# Patient Record
Sex: FEMALE | Race: WHITE
[De-identification: names, ages, dates, MRNs, and addresses within clinical notes are randomized per-mention and may not be internally consistent; named-entity substitution may affect disease eponyms.]

---

## 2024-09-16 PROBLEM — Z00.00 ENCOUNTER FOR PREVENTIVE HEALTH EXAMINATION: Status: ACTIVE | Noted: 2024-09-16

## 2024-09-30 ENCOUNTER — APPOINTMENT (OUTPATIENT)
Dept: HEPATOLOGY | Facility: CLINIC | Age: 24
End: 2024-09-30
Payer: COMMERCIAL

## 2024-09-30 VITALS
DIASTOLIC BLOOD PRESSURE: 85 MMHG | SYSTOLIC BLOOD PRESSURE: 139 MMHG | BODY MASS INDEX: 27.6 KG/M2 | RESPIRATION RATE: 16 BRPM | HEIGHT: 62 IN | HEART RATE: 75 BPM | WEIGHT: 150 LBS | OXYGEN SATURATION: 97 %

## 2024-09-30 DIAGNOSIS — R74.8 ABNORMAL LEVELS OF OTHER SERUM ENZYMES: ICD-10-CM

## 2024-09-30 PROCEDURE — 99204 OFFICE O/P NEW MOD 45 MIN: CPT

## 2024-09-30 NOTE — REVIEW OF SYSTEMS
[Fever] : no fever [Chills] : no chills [Nosebleeds] : no nosebleeds [Shortness Of Breath] : no shortness of breath [Wheezing] : no wheezing [Cough] : no cough [Abdominal Pain] : no abdominal pain [Vomiting] : no vomiting [Constipation] : no constipation [Diarrhea] : no diarrhea [Melena] : no melena [Limb Swelling] : no limb swelling [Confused] : no confusion [Anxiety] : no anxiety [Easy Bleeding] : no tendency for easy bleeding [Easy Bruising] : no tendency for easy bruising [de-identified] : stress rash in neck

## 2024-09-30 NOTE — HISTORY OF PRESENT ILLNESS
[FreeTextEntry1] : Referring from Dr. Tapia RFR: WILFREDO Devlin is a 24y female with frequent UTI since 2021 (last UTI 6/2024) who is referred by rheumatology/PCP for LFT elevation 6/2024 and WILFREDO 1:320 to  WILFREDO 1: 640.   [Timeline]  PT took Macrobid 6/2024 for UTI.  PCP labs for right abd pain in 6/2024.  RUQ pain started late 7/2024 until 8/2024 Pt took Augmentin in late 9/2024 for sinus infection.  Today pt complains of itchiness whole body. dry eye, no diarrhea constipation. RUQ pain is resolved. OCP since 2000  [Medical Hx] as above [Surgical Hx] wisdom teeth remove [Social Hx] Alcohol: Social                Tobacco: Never     illicit drug: Denies           Herb and dietary Supplement: protein shake : fairlife milk [FMH of liver disease]  Father: OLT 2013 2/2 Hep C cirrhosis maternal grandmother- AIH    [Labs]  9/11/24 Neg: ACE AMA SMA LKM IGM IGA IGG, RA Lupus  7/15/24 AST/ALT 20/17    [Imaging]  CT abd 6/20/24: Mild hepatomegaly  [Procedures] EGD: No prior Colonoscopy: No prior

## 2024-09-30 NOTE — PHYSICAL EXAM
[Non-Tender] : non-tender [General Appearance - Alert] : alert [Sclera] : the sclera and conjunctiva were normal [Outer Ear] : the ears and nose were normal in appearance [] : no respiratory distress [Heart Sounds] : normal S1 and S2 [Murmurs] : no murmurs [Abnormal Walk] : normal gait [Skin Color & Pigmentation] : normal skin color and pigmentation [Oriented To Time, Place, And Person] : oriented to person, place, and time [Impaired Insight] : insight and judgment were intact [Affect] : the affect was normal [Scleral Icterus] : No Scleral Icterus [Spider Angioma] : No spider angioma(s) were observed [Abdominal  Ascites] : no ascites [Splenomegaly] : no splenomegaly [Asterixis] : no asterixis observed [Jaundice] : No jaundice

## 2024-09-30 NOTE — ASSESSMENT
[FreeTextEntry1] :  [Assessment]  24y female with frequent UTI since 2021 (last UTI 6/2024) who is referred by rheumatology/PCP for LFT elevation 6/2024 and WILFREDO 1:320 to WILFREDO 1: 640.  PT took Macrobid 6/2024 for UTI.  PCP labs for right abd pain in 6/2024.   [Plan]   # LFT elevation in 6/2024 # WILFREDO 1:640 likely due to DILI since LFT wnl in 7/2024 Pt does not have AIH Livertox: Nitrofurantoin is currently one of the most common causes of drug induced liver injury. Liver injury from nitrofurantoin can cause either an acute or chronic hepatitis-like syndrome.  - Stop take Macrobid  - Education and counseling were provided to the patient. - Discussed at length with the patient that next course of action would depend on results   Labs toady Fibroscan in a month RTO in a month   # Health Maintenance - Screen Hep A, B, C

## 2024-09-30 NOTE — REVIEW OF SYSTEMS
[Fever] : no fever [Chills] : no chills [Nosebleeds] : no nosebleeds [Shortness Of Breath] : no shortness of breath [Wheezing] : no wheezing [Cough] : no cough [Abdominal Pain] : no abdominal pain [Vomiting] : no vomiting [Constipation] : no constipation [Diarrhea] : no diarrhea [Melena] : no melena [Limb Swelling] : no limb swelling [Confused] : no confusion [Anxiety] : no anxiety [Easy Bleeding] : no tendency for easy bleeding [Easy Bruising] : no tendency for easy bruising [de-identified] : stress rash in neck

## 2024-11-04 ENCOUNTER — APPOINTMENT (OUTPATIENT)
Dept: HEPATOLOGY | Facility: CLINIC | Age: 24
End: 2024-11-04

## 2024-11-04 DIAGNOSIS — R74.8 ABNORMAL LEVELS OF OTHER SERUM ENZYMES: ICD-10-CM

## 2024-11-04 PROCEDURE — 99212 OFFICE O/P EST SF 10 MIN: CPT

## 2024-11-04 PROCEDURE — 76981 USE PARENCHYMA: CPT

## 2024-11-11 LAB
ESTIMATED AVERAGE GLUCOSE: 97 MG/DL
HBA1C MFR BLD HPLC: 5 %

## 2024-12-02 ENCOUNTER — APPOINTMENT (OUTPATIENT)
Dept: RHEUMATOLOGY | Facility: CLINIC | Age: 24
End: 2024-12-02
Payer: COMMERCIAL

## 2024-12-02 ENCOUNTER — NON-APPOINTMENT (OUTPATIENT)
Age: 24
End: 2024-12-02

## 2024-12-02 VITALS
HEIGHT: 62 IN | WEIGHT: 154 LBS | OXYGEN SATURATION: 98 % | BODY MASS INDEX: 28.34 KG/M2 | DIASTOLIC BLOOD PRESSURE: 68 MMHG | SYSTOLIC BLOOD PRESSURE: 118 MMHG | HEART RATE: 88 BPM | TEMPERATURE: 97.6 F

## 2024-12-02 DIAGNOSIS — R76.8 OTHER SPECIFIED ABNORMAL IMMUNOLOGICAL FINDINGS IN SERUM: ICD-10-CM

## 2024-12-02 PROCEDURE — 99205 OFFICE O/P NEW HI 60 MIN: CPT

## 2024-12-02 PROCEDURE — G2211 COMPLEX E/M VISIT ADD ON: CPT | Mod: NC

## 2024-12-03 PROBLEM — R76.8 ANA POSITIVE: Status: ACTIVE | Noted: 2024-12-03

## 2025-01-09 ENCOUNTER — NON-APPOINTMENT (OUTPATIENT)
Age: 25
End: 2025-01-09

## 2025-01-12 ENCOUNTER — INPATIENT (INPATIENT)
Facility: HOSPITAL | Age: 25
LOS: 0 days | Discharge: HOME | DRG: 872 | End: 2025-01-13
Attending: STUDENT IN AN ORGANIZED HEALTH CARE EDUCATION/TRAINING PROGRAM | Admitting: STUDENT IN AN ORGANIZED HEALTH CARE EDUCATION/TRAINING PROGRAM
Payer: COMMERCIAL

## 2025-01-12 VITALS
TEMPERATURE: 99 F | RESPIRATION RATE: 19 BRPM | HEART RATE: 142 BPM | DIASTOLIC BLOOD PRESSURE: 90 MMHG | WEIGHT: 153 LBS | OXYGEN SATURATION: 96 % | SYSTOLIC BLOOD PRESSURE: 135 MMHG | HEIGHT: 62 IN

## 2025-01-12 DIAGNOSIS — Z29.9 ENCOUNTER FOR PROPHYLACTIC MEASURES, UNSPECIFIED: ICD-10-CM

## 2025-01-12 DIAGNOSIS — K52.9 NONINFECTIVE GASTROENTERITIS AND COLITIS, UNSPECIFIED: ICD-10-CM

## 2025-01-12 DIAGNOSIS — R11.2 NAUSEA WITH VOMITING, UNSPECIFIED: ICD-10-CM

## 2025-01-12 DIAGNOSIS — D72.825 BANDEMIA: ICD-10-CM

## 2025-01-12 LAB
ALBUMIN SERPL ELPH-MCNC: 3.1 G/DL — LOW (ref 3.3–5)
ALBUMIN SERPL ELPH-MCNC: 3.7 G/DL — SIGNIFICANT CHANGE UP (ref 3.3–5)
ALP SERPL-CCNC: 102 U/L — SIGNIFICANT CHANGE UP (ref 40–120)
ALP SERPL-CCNC: 85 U/L — SIGNIFICANT CHANGE UP (ref 40–120)
ALT FLD-CCNC: 19 U/L — SIGNIFICANT CHANGE UP (ref 12–78)
ALT FLD-CCNC: 21 U/L — SIGNIFICANT CHANGE UP (ref 12–78)
ANION GAP SERPL CALC-SCNC: 7 MMOL/L — SIGNIFICANT CHANGE UP (ref 5–17)
ANION GAP SERPL CALC-SCNC: 8 MMOL/L — SIGNIFICANT CHANGE UP (ref 5–17)
AST SERPL-CCNC: 18 U/L — SIGNIFICANT CHANGE UP (ref 15–37)
AST SERPL-CCNC: 18 U/L — SIGNIFICANT CHANGE UP (ref 15–37)
BASOPHILS # BLD AUTO: 0 K/UL — SIGNIFICANT CHANGE UP (ref 0–0.2)
BASOPHILS # BLD AUTO: 0.03 K/UL — SIGNIFICANT CHANGE UP (ref 0–0.2)
BASOPHILS NFR BLD AUTO: 0 % — SIGNIFICANT CHANGE UP (ref 0–2)
BASOPHILS NFR BLD AUTO: 0.3 % — SIGNIFICANT CHANGE UP (ref 0–2)
BILIRUB SERPL-MCNC: 0.6 MG/DL — SIGNIFICANT CHANGE UP (ref 0.2–1.2)
BILIRUB SERPL-MCNC: 0.7 MG/DL — SIGNIFICANT CHANGE UP (ref 0.2–1.2)
BUN SERPL-MCNC: 12 MG/DL — SIGNIFICANT CHANGE UP (ref 7–23)
BUN SERPL-MCNC: 9 MG/DL — SIGNIFICANT CHANGE UP (ref 7–23)
CALCIUM SERPL-MCNC: 7.7 MG/DL — LOW (ref 8.5–10.1)
CALCIUM SERPL-MCNC: 9.1 MG/DL — SIGNIFICANT CHANGE UP (ref 8.5–10.1)
CHLORIDE SERPL-SCNC: 108 MMOL/L — SIGNIFICANT CHANGE UP (ref 96–108)
CHLORIDE SERPL-SCNC: 109 MMOL/L — HIGH (ref 96–108)
CO2 SERPL-SCNC: 22 MMOL/L — SIGNIFICANT CHANGE UP (ref 22–31)
CO2 SERPL-SCNC: 23 MMOL/L — SIGNIFICANT CHANGE UP (ref 22–31)
CREAT SERPL-MCNC: 0.87 MG/DL — SIGNIFICANT CHANGE UP (ref 0.5–1.3)
CREAT SERPL-MCNC: 0.94 MG/DL — SIGNIFICANT CHANGE UP (ref 0.5–1.3)
EGFR: 88 ML/MIN/1.73M2 — SIGNIFICANT CHANGE UP
EGFR: 97 ML/MIN/1.73M2 — SIGNIFICANT CHANGE UP
EOSINOPHIL # BLD AUTO: 0 K/UL — SIGNIFICANT CHANGE UP (ref 0–0.5)
EOSINOPHIL # BLD AUTO: 0 K/UL — SIGNIFICANT CHANGE UP (ref 0–0.5)
EOSINOPHIL NFR BLD AUTO: 0 % — SIGNIFICANT CHANGE UP (ref 0–6)
EOSINOPHIL NFR BLD AUTO: 0 % — SIGNIFICANT CHANGE UP (ref 0–6)
GI PCR PANEL: DETECTED
GLUCOSE SERPL-MCNC: 119 MG/DL — HIGH (ref 70–99)
GLUCOSE SERPL-MCNC: 156 MG/DL — HIGH (ref 70–99)
HCG SERPL-ACNC: <1 MIU/ML — SIGNIFICANT CHANGE UP
HCT VFR BLD CALC: 38.9 % — SIGNIFICANT CHANGE UP (ref 34.5–45)
HCT VFR BLD CALC: 40.6 % — SIGNIFICANT CHANGE UP (ref 34.5–45)
HCT VFR BLD CALC: 41.9 % — SIGNIFICANT CHANGE UP (ref 34.5–45)
HCT VFR BLD CALC: 45 % — SIGNIFICANT CHANGE UP (ref 34.5–45)
HGB BLD-MCNC: 13.7 G/DL — SIGNIFICANT CHANGE UP (ref 11.5–15.5)
HGB BLD-MCNC: 14 G/DL — SIGNIFICANT CHANGE UP (ref 11.5–15.5)
HGB BLD-MCNC: 14.5 G/DL — SIGNIFICANT CHANGE UP (ref 11.5–15.5)
HGB BLD-MCNC: 16 G/DL — HIGH (ref 11.5–15.5)
IMM GRANULOCYTES NFR BLD AUTO: 0.5 % — SIGNIFICANT CHANGE UP (ref 0–0.9)
LIDOCAIN IGE QN: 25 U/L — SIGNIFICANT CHANGE UP (ref 13–75)
LYMPHOCYTES # BLD AUTO: 0.23 K/UL — LOW (ref 1–3.3)
LYMPHOCYTES # BLD AUTO: 0.37 K/UL — LOW (ref 1–3.3)
LYMPHOCYTES # BLD AUTO: 2 % — LOW (ref 13–44)
LYMPHOCYTES # BLD AUTO: 3.9 % — LOW (ref 13–44)
MAGNESIUM SERPL-MCNC: 1.6 MG/DL — SIGNIFICANT CHANGE UP (ref 1.6–2.6)
MCHC RBC-ENTMCNC: 31.3 PG — SIGNIFICANT CHANGE UP (ref 27–34)
MCHC RBC-ENTMCNC: 31.4 PG — SIGNIFICANT CHANGE UP (ref 27–34)
MCHC RBC-ENTMCNC: 34.6 G/DL — SIGNIFICANT CHANGE UP (ref 32–36)
MCHC RBC-ENTMCNC: 35.6 G/DL — SIGNIFICANT CHANGE UP (ref 32–36)
MCV RBC AUTO: 88.4 FL — SIGNIFICANT CHANGE UP (ref 80–100)
MCV RBC AUTO: 90.5 FL — SIGNIFICANT CHANGE UP (ref 80–100)
MONOCYTES # BLD AUTO: 0.23 K/UL — SIGNIFICANT CHANGE UP (ref 0–0.9)
MONOCYTES # BLD AUTO: 0.37 K/UL — SIGNIFICANT CHANGE UP (ref 0–0.9)
MONOCYTES NFR BLD AUTO: 2 % — SIGNIFICANT CHANGE UP (ref 2–14)
MONOCYTES NFR BLD AUTO: 3.9 % — SIGNIFICANT CHANGE UP (ref 2–14)
NEUTROPHILS # BLD AUTO: 10.88 K/UL — HIGH (ref 1.8–7.4)
NEUTROPHILS # BLD AUTO: 8.63 K/UL — HIGH (ref 1.8–7.4)
NEUTROPHILS NFR BLD AUTO: 82 % — HIGH (ref 43–77)
NEUTROPHILS NFR BLD AUTO: 91.4 % — HIGH (ref 43–77)
NOROVIRUS GI+II RNA STL QL NAA+NON-PROBE: DETECTED
NRBC # BLD: 0 /100 WBCS — SIGNIFICANT CHANGE UP (ref 0–0)
NRBC # BLD: SIGNIFICANT CHANGE UP /100 WBCS (ref 0–0)
PHOSPHATE SERPL-MCNC: 2.7 MG/DL — SIGNIFICANT CHANGE UP (ref 2.5–4.5)
PLATELET # BLD AUTO: 269 K/UL — SIGNIFICANT CHANGE UP (ref 150–400)
PLATELET # BLD AUTO: 299 K/UL — SIGNIFICANT CHANGE UP (ref 150–400)
POTASSIUM SERPL-MCNC: 3.8 MMOL/L — SIGNIFICANT CHANGE UP (ref 3.5–5.3)
POTASSIUM SERPL-MCNC: 3.8 MMOL/L — SIGNIFICANT CHANGE UP (ref 3.5–5.3)
POTASSIUM SERPL-SCNC: 3.8 MMOL/L — SIGNIFICANT CHANGE UP (ref 3.5–5.3)
POTASSIUM SERPL-SCNC: 3.8 MMOL/L — SIGNIFICANT CHANGE UP (ref 3.5–5.3)
PROT SERPL-MCNC: 6.1 G/DL — SIGNIFICANT CHANGE UP (ref 6–8.3)
PROT SERPL-MCNC: 7.4 G/DL — SIGNIFICANT CHANGE UP (ref 6–8.3)
RBC # BLD: 4.63 M/UL — SIGNIFICANT CHANGE UP (ref 3.8–5.2)
RBC # BLD: 5.09 M/UL — SIGNIFICANT CHANGE UP (ref 3.8–5.2)
RBC # FLD: 12 % — SIGNIFICANT CHANGE UP (ref 10.3–14.5)
RBC # FLD: 12.4 % — SIGNIFICANT CHANGE UP (ref 10.3–14.5)
SODIUM SERPL-SCNC: 138 MMOL/L — SIGNIFICANT CHANGE UP (ref 135–145)
SODIUM SERPL-SCNC: 139 MMOL/L — SIGNIFICANT CHANGE UP (ref 135–145)
WBC # BLD: 11.33 K/UL — HIGH (ref 3.8–10.5)
WBC # BLD: 9.45 K/UL — SIGNIFICANT CHANGE UP (ref 3.8–10.5)
WBC # FLD AUTO: 11.33 K/UL — HIGH (ref 3.8–10.5)
WBC # FLD AUTO: 9.45 K/UL — SIGNIFICANT CHANGE UP (ref 3.8–10.5)

## 2025-01-12 PROCEDURE — 99223 1ST HOSP IP/OBS HIGH 75: CPT | Mod: GC

## 2025-01-12 PROCEDURE — 93010 ELECTROCARDIOGRAM REPORT: CPT

## 2025-01-12 PROCEDURE — 99285 EMERGENCY DEPT VISIT HI MDM: CPT

## 2025-01-12 PROCEDURE — 74177 CT ABD & PELVIS W/CONTRAST: CPT | Mod: 26

## 2025-01-12 RX ORDER — GINKGO BILOBA 40 MG
3 CAPSULE ORAL AT BEDTIME
Refills: 0 | Status: DISCONTINUED | OUTPATIENT
Start: 2025-01-12 | End: 2025-01-13

## 2025-01-12 RX ORDER — ONDANSETRON 4 MG/1
4 TABLET ORAL ONCE
Refills: 0 | Status: COMPLETED | OUTPATIENT
Start: 2025-01-12 | End: 2025-01-12

## 2025-01-12 RX ORDER — CEFTRIAXONE SODIUM 1 G/1
1000 INJECTION, POWDER, FOR SOLUTION INTRAMUSCULAR; INTRAVENOUS EVERY 24 HOURS
Refills: 0 | Status: DISCONTINUED | OUTPATIENT
Start: 2025-01-13 | End: 2025-01-13

## 2025-01-12 RX ORDER — SODIUM CHLORIDE 9 MG/ML
1000 INJECTION, SOLUTION INTRAMUSCULAR; INTRAVENOUS; SUBCUTANEOUS
Refills: 0 | Status: DISCONTINUED | OUTPATIENT
Start: 2025-01-12 | End: 2025-01-13

## 2025-01-12 RX ORDER — PIPERACILLIN AND TAZOBACTAM 3; .375 G/15ML; G/15ML
3.38 INJECTION, POWDER, LYOPHILIZED, FOR SOLUTION INTRAVENOUS EVERY 8 HOURS
Refills: 0 | Status: DISCONTINUED | OUTPATIENT
Start: 2025-01-12 | End: 2025-01-12

## 2025-01-12 RX ORDER — ACETAMINOPHEN 80 MG/.8ML
650 SOLUTION/ DROPS ORAL EVERY 6 HOURS
Refills: 0 | Status: DISCONTINUED | OUTPATIENT
Start: 2025-01-12 | End: 2025-01-13

## 2025-01-12 RX ORDER — SODIUM CHLORIDE 9 MG/ML
1000 INJECTION, SOLUTION INTRAMUSCULAR; INTRAVENOUS; SUBCUTANEOUS ONCE
Refills: 0 | Status: COMPLETED | OUTPATIENT
Start: 2025-01-12 | End: 2025-01-12

## 2025-01-12 RX ORDER — PIPERACILLIN AND TAZOBACTAM 3; .375 G/15ML; G/15ML
3.38 INJECTION, POWDER, LYOPHILIZED, FOR SOLUTION INTRAVENOUS ONCE
Refills: 0 | Status: COMPLETED | OUTPATIENT
Start: 2025-01-12 | End: 2025-01-12

## 2025-01-12 RX ORDER — MAG HYDROX/ALUMINUM HYD/SIMETH 200-200-20
30 SUSPENSION, ORAL (FINAL DOSE FORM) ORAL EVERY 4 HOURS
Refills: 0 | Status: DISCONTINUED | OUTPATIENT
Start: 2025-01-12 | End: 2025-01-13

## 2025-01-12 RX ORDER — ONDANSETRON 4 MG/1
4 TABLET ORAL EVERY 8 HOURS
Refills: 0 | Status: DISCONTINUED | OUTPATIENT
Start: 2025-01-12 | End: 2025-01-13

## 2025-01-12 RX ADMIN — SODIUM CHLORIDE 100 MILLILITER(S): 9 INJECTION, SOLUTION INTRAMUSCULAR; INTRAVENOUS; SUBCUTANEOUS at 15:27

## 2025-01-12 RX ADMIN — ONDANSETRON 4 MILLIGRAM(S): 4 TABLET ORAL at 07:49

## 2025-01-12 RX ADMIN — ONDANSETRON 4 MILLIGRAM(S): 4 TABLET ORAL at 03:57

## 2025-01-12 RX ADMIN — SODIUM CHLORIDE 1000 MILLILITER(S): 9 INJECTION, SOLUTION INTRAMUSCULAR; INTRAVENOUS; SUBCUTANEOUS at 03:53

## 2025-01-12 RX ADMIN — PIPERACILLIN AND TAZOBACTAM 200 GRAM(S): 3; .375 INJECTION, POWDER, LYOPHILIZED, FOR SOLUTION INTRAVENOUS at 05:30

## 2025-01-12 RX ADMIN — Medication 30 MILLILITER(S): at 22:12

## 2025-01-12 RX ADMIN — SODIUM CHLORIDE 1000 MILLILITER(S): 9 INJECTION, SOLUTION INTRAMUSCULAR; INTRAVENOUS; SUBCUTANEOUS at 03:57

## 2025-01-12 RX ADMIN — Medication 20 MILLIGRAM(S): at 12:24

## 2025-01-12 RX ADMIN — PIPERACILLIN AND TAZOBACTAM 25 GRAM(S): 3; .375 INJECTION, POWDER, LYOPHILIZED, FOR SOLUTION INTRAVENOUS at 09:53

## 2025-01-12 RX ADMIN — Medication 30 MILLILITER(S): at 13:44

## 2025-01-12 RX ADMIN — PIPERACILLIN AND TAZOBACTAM 25 GRAM(S): 3; .375 INJECTION, POWDER, LYOPHILIZED, FOR SOLUTION INTRAVENOUS at 18:25

## 2025-01-12 RX ADMIN — Medication 25 MILLIGRAM(S): at 09:53

## 2025-01-12 NOTE — H&P ADULT - NSHPREVIEWOFSYSTEMS_GEN_ALL_CORE
CONSTITUTIONAL: denies fever, chills, diaphoresis, fatigue, weakness, dizziness, lightheadedness  HEENT: denies blurred vision, sore throat, cough  SKIN: denies new lesions, rash, hives, itching  CARDIOVASCULAR: denies chest pain, chest pressure, palpitations  RESPIRATORY: denies shortness of breath, MCKEON, wheezing, sputum production  GASTROINTESTINAL: +abdominal pain, +vomiting, +diarrhea, +melena  GENITOURINARY: denies dysuria, polyuria, hematuria, discharge  NEUROLOGICAL: denies syncope, LOC, numbness, headache, focal weakness  MUSCULOSKELETAL: denies new joint pain, joint swelling, muscle aches  HEMATOLOGIC: denies gross bleeding, bruising  LYMPHATICS: denies enlarged lymph nodes, extremity swelling  PSYCHIATRIC: denies recent changes in anxiety, depression  ENDOCRINOLOGIC: denies sweating, cold or heat intolerance

## 2025-01-12 NOTE — PATIENT PROFILE ADULT - FUNCTIONAL ASSESSMENT - DAILY ACTIVITY 5.
Plan: Pt will follow up with derm after initial tx on arms; will treat face/scalp in future Initiate Treatment: 5FU BID x 2 weeks, then d/c. Detail Level: Simple 4 = No assist / stand by assistance

## 2025-01-12 NOTE — PROVIDER CONTACT NOTE (CRITICAL VALUE NOTIFICATION) - ASSESSMENT
Spoke with Luz @ [facility]. Prescription for compounded testosterone is 1mg/gram daily 30 gram tube 1 refill.     Also please advise on pt concern on the recent estrogen dosage received.    Patient currently in ct scan.

## 2025-01-12 NOTE — H&P ADULT - NSHPPHYSICALEXAM_GEN_ALL_CORE
T(C): 37.4 (01-12-25 @ 05:33), Max: 37.4 (01-12-25 @ 05:33)  HR: 118 (01-12-25 @ 05:33) (118 - 142)  BP: 128/91 (01-12-25 @ 05:33) (128/91 - 135/90)  RR: 18 (01-12-25 @ 05:33) (18 - 19)  SpO2: 98% (01-12-25 @ 05:33) (96% - 98%)    GENERAL: patient appears well, no acute distress, appropriate, pleasant  EYES: sclera clear, no exudates  ENMT: oropharynx clear without erythema, no exudates, moist mucous membranes  NECK: supple, soft, no thyromegaly noted  LUNGS: good air entry bilaterally, clear to auscultation, symmetric breath sounds, no wheezing or rhonchi appreciated  HEART: soft S1/S2, regular rate and rhythm, no murmurs noted, no lower extremity edema  GASTROINTESTINAL: abdomen is soft, nontender, nondistended, normoactive bowel sounds, no palpable masses  INTEGUMENT: good skin turgor, no lesions noted  MUSCULOSKELETAL: no clubbing or cyanosis  NEURO: alert and oriented x3   PSYCHIATRIC: mood is good, affect is congruent, linear and logical thought process

## 2025-01-12 NOTE — H&P ADULT - NSHPSOCIALHISTORY_GEN_ALL_CORE
Tobacco: denies  EtOH: socially  Recreational drug use: denies  Lives with: patient lives with mother  Ambulates: independent  ADLs: independent

## 2025-01-12 NOTE — ED PROVIDER NOTE - OBJECTIVE STATEMENT
24-year-old female no significant past medical history complaining of nausea vomiting diarrhea since this afternoon states around 7 episodes each with the last 3 episodes of diarrhea noting some blood in the stool.  Ate Japanese food for lunch but did not eat any sushi had a chicken teriyaki.  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  Denies any fever or chills.  Tried Pepto-Bismol without much relief.

## 2025-01-12 NOTE — H&P ADULT - PROBLEM SELECTOR PLAN 1
-Pt presenting with multiple episodes of nausea and vomiting starting earlier today as well as blood tinged stools after eating chicken ramiro,   -in ED given: Zosyn 3.375g x1, NS 1L x2   -CT A/P: Fluid-filled nondilated loops of small bowel, which can be seen with nonspecific enteritis.  -continue Zosyn 3.375mg q8  -continue IV fluids   -bandemia noted on ED labs, f/u AM CBC   -order FOBT, f/u results   -order GI PCR, f/u results   -ID consulted (Dr. Holloway), f/u recs  -GI consulted (Dr. Mata), f/u recs -Pt presenting with multiple episodes of nausea and vomiting starting earlier today as well as blood tinged stools after eating chicken ramiro,   -in ED given: Zosyn 3.375g x1, NS 1L x2   -CT A/P: Fluid-filled nondilated loops of small bowel, which can be seen with nonspecific enteritis.  -continue Zosyn 3.375mg q8  -continue IV fluids   -bandemia noted on ED labs, f/u AM CBC   -order FOBT, f/u results   -order GI PCR, f/u results   -ID consulted (Dr. Hamlin) f/u recs  -GI consulted (Dr. Mata), f/u recs

## 2025-01-12 NOTE — H&P ADULT - NSICDXFAMILYHX_GEN_ALL_CORE_FT
FAMILY HISTORY:  Father  Still living? Unknown  Family history of hypertension, Age at diagnosis: Age Unknown  FH: type 2 diabetes mellitus, Age at diagnosis: Age Unknown    Mother  Still living? Unknown  Family history of hypertension, Age at diagnosis: Age Unknown    Grandparent  Still living? Unknown  Family history of breast cancer, Age at diagnosis: Age Unknown  FH: type 2 diabetes mellitus, Age at diagnosis: Age Unknown

## 2025-01-12 NOTE — CONSULT NOTE ADULT - SUBJECTIVE AND OBJECTIVE BOX
Chief Complaint:  Patient is a 24y old  Female who presents with a chief complaint of nausea and vomiting, bandemia (12 Jan 2025 05:45)    Anxiety and depression    No significant past surgical history    No significant past surgical history       HPI:  24yr old F PMH  of anxiety and depression presents to ED for vomiting and bloody diarrhea that started earlier today. Pt reports she had chicken teriyaki early afternoon at the mall. After 2 hrs she started to have stomach upset with bloating, about 7 episodes of vomiting and is unable to keep any food down. Shortly after she started having bloody tinged stools which prompted her to come to ED. Denies HA, fevers, chills, chest pain, palpitations, SOB, cough, dysuria, hematuria. Pt was recently dx with UTI 2 weeks ago and completed course of cefdinir 300mg BID x7 days.       ED course:  Vitals: BP: 135/90 , HR: 142 , Temp: 99.1, RR: 12, SpO2: 96% on RA   Labs significant for: WBC 11.33, Neutrophil: 10.88, band neutrophils 14.0%  EKG: pending   In ED given Zosyn 3.375g x1, NS 1L x2     Imaging  CT a/p: Fluid-filled nondilated loops of small bowel, which can be seen with   nonspecific enteritis.   (12 Jan 2025 05:45)      Macrobid (Other (Mild to Mod))      acetaminophen     Tablet .. 650 milliGRAM(s) Oral every 6 hours PRN  aluminum hydroxide/magnesium hydroxide/simethicone Suspension 30 milliLiter(s) Oral every 4 hours PRN  FLUoxetine 20 milliGRAM(s) Oral daily  melatonin 3 milliGRAM(s) Oral at bedtime PRN  ondansetron Injectable 4 milliGRAM(s) IV Push every 8 hours PRN  piperacillin/tazobactam IVPB.. 3.375 Gram(s) IV Intermittent every 8 hours  sodium chloride 0.9%. 1000 milliLiter(s) IV Continuous <Continuous>        FAMILY HISTORY:  Family history of breast cancer (Grandparent)    Family history of hypertension (Father, Mother)    FH: type 2 diabetes mellitus (Grandparent, Father)          Review of Systems:    General:  No wt loss, fevers, chills, night sweats,fatigue,   Eyes:  Good vision, no reported pain  ENT:  No sore throat, pain, runny nose, dysphagia  CV:  No pain, palpitatioins, hypo/hypertension  Resp:  No dyspnea, cough, tachypnea, wheezing  :  No pain, bleeding, incontinence, nocturia  Muscle:  No pain, weakness  Neuro:  No weakness, tingling, memory problems  Psych:  No fatigue, insomnia, mood problems, depression  Endocrine:  No polyuria, polydypsia, cold/heat intolerance  Heme:  No petechiae, ecchymosis, easy bruisability  Skin:  No rash, tattoos, scars, edema    Relevant Family History:       Relevant Social History:       Physical Exam:    Vital Signs:  Vital Signs Last 24 Hrs  T(C): 37.4 (12 Jan 2025 05:33), Max: 37.4 (12 Jan 2025 05:33)  T(F): 99.3 (12 Jan 2025 05:33), Max: 99.3 (12 Jan 2025 05:33)  HR: 118 (12 Jan 2025 05:33) (118 - 142)  BP: 128/91 (12 Jan 2025 05:33) (128/91 - 135/90)  BP(mean): --  RR: 18 (12 Jan 2025 05:33) (18 - 19)  SpO2: 98% (12 Jan 2025 05:33) (96% - 98%)    Parameters below as of 12 Jan 2025 05:33  Patient On (Oxygen Delivery Method): room air      Daily Height in cm: 157.48 (12 Jan 2025 03:24)    Daily     General:  Appears stated age, well-groomed, well-nourished, no distress  HEENT:  NC/AT,  conjunctivae clear and pink, no thyromegaly, nodules, adenopathy, no JVD  Chest:  Full & symmetric excursion, no increased effort, breath sounds clear  Cardiovascular:  Regular rhythm, S1, S2, no murmur/rub/S3/S4, no abdominal bruit, no edema  Abdomen:  Soft, non-tender, non-distended, normoactive bowel sounds,  no masses ,no hepatosplenomeagaly, no signs of chronic liver disease  Extremities:  no cyanosis,clubbing or edema  Skin:  No rash/erythema/ecchymoses/petechiae/wounds/abscess/warm/dry  Neuro/Psych:  Alert, oriented, no asterixis, no tremor, no encephalopathy    Laboratory:                            14.5   9.45  )-----------( 269      ( 12 Jan 2025 07:08 )             41.9     01-12    138  |  108  |  12  ----------------------------<  156[H]  3.8   |  22  |  0.94    Ca    9.1      12 Jan 2025 03:55  Mg     1.6     01-12    TPro  7.4  /  Alb  3.7  /  TBili  0.7  /  DBili  x   /  AST  18  /  ALT  21  /  AlkPhos  102  01-12    LIVER FUNCTIONS - ( 12 Jan 2025 03:55 )  Alb: 3.7 g/dL / Pro: 7.4 g/dL / ALK PHOS: 102 U/L / ALT: 21 U/L / AST: 18 U/L / GGT: x             Urinalysis Basic - ( 12 Jan 2025 03:55 )    Color: x / Appearance: x / SG: x / pH: x  Gluc: 156 mg/dL / Ketone: x  / Bili: x / Urobili: x   Blood: x / Protein: x / Nitrite: x   Leuk Esterase: x / RBC: x / WBC x   Sq Epi: x / Non Sq Epi: x / Bacteria: x      Amylase Serum--      Lipase serum25       Ammonia--    Imaging:          
  HPI:  25YO F PMH anxiety and depression presents to ED for vomiting and bloody diarrhea that started few hours after she had chicken teriyaki at the mall. After 2 hrs she started to have stomach upset with bloating, about 7 episodes of vomiting and is unable to keep any food down. Shortly after she started having bloody tinged stools which prompted her to come to ED. Denies HA, fevers, chills, chest pain, palpitations, SOB, cough, dysuria, hematuria. Pt was recently dx with UTI 2 weeks ago and completed course of cefdinir 300mg BID x7 days. In ED WBC 11.33 CT AP Fluid-filled nondilated loops of small bowel, which can be seen with nonspecific enteritis.    Infectious Disease consult was called to evaluate pt and for antibiotic management.    Past Medical & Surgical Hx:  PAST MEDICAL & SURGICAL HISTORY:  Anxiety and depression      Social History--  EtOH: denies  Tobacco: denies   Drug Use: denies      FAMILY HISTORY:  Family history of breast cancer (Grandparent)    Family history of hypertension (Father, Mother)    FH: type 2 diabetes mellitus (Grandparent, Father)        Allergies  Macrobid (Other (Mild to Mod))    Intolerances  NONE    Home Medications:  PROzac 20 mg oral capsule: 1 cap(s) orally once a day (12 Jan 2025 06:11)      Current Inpatient Medications :    ANTIBIOTICS:   piperacillin/tazobactam IVPB.. 3.375 Gram(s) IV Intermittent every 8 hours      OTHER RELEVANT MEDICATIONS :  acetaminophen     Tablet .. 650 milliGRAM(s) Oral every 6 hours PRN  aluminum hydroxide/magnesium hydroxide/simethicone Suspension 30 milliLiter(s) Oral every 4 hours PRN  FLUoxetine 20 milliGRAM(s) Oral daily  hydrOXYzine hydrochloride 25 milliGRAM(s) Oral every 6 hours PRN  melatonin 3 milliGRAM(s) Oral at bedtime PRN  ondansetron Injectable 4 milliGRAM(s) IV Push every 8 hours PRN  sodium chloride 0.9%. 1000 milliLiter(s) IV Continuous <Continuous>    ROS:  CONSTITUTIONAL:  Negative fever or chills, feels well, good appetite  EYES:  Negative  blurry vision or double vision  CARDIOVASCULAR:  Negative for chest pain or palpitations  RESPIRATORY:  Negative for cough, wheezing, or SOB   GASTROINTESTINAL:  +nausea, vomiting, bloody diarrhea abdominal pain  GENITOURINARY:  Negative frequency, urgency , dysuria or hematuria   NEUROLOGIC:  No headache, confusion, dizziness, lightheadedness  All other systems were reviewed and are negative    Physical Exam:  Vital Signs Last 24 Hrs  T(C): 37.1 (12 Jan 2025 17:42), Max: 37.4 (12 Jan 2025 05:33)  T(F): 98.7 (12 Jan 2025 17:42), Max: 99.4 (12 Jan 2025 08:25)  HR: 101 (12 Jan 2025 17:42) (101 - 142)  BP: 118/82 (12 Jan 2025 17:42) (113/80 - 135/90)  RR: 18 (12 Jan 2025 17:42) (18 - 19)  SpO2: 98% (12 Jan 2025 17:42) (96% - 98%)    Parameters below as of 12 Jan 2025 16:10  Patient On (Oxygen Delivery Method): room air      Height (cm): 157.5 (01-12 @ 03:24)  Weight (kg): 69.4 (01-12 @ 03:24)  BMI (kg/m2): 28 (01-12 @ 03:24)  BSA (m2): 1.71 (01-12 @ 03:24)    General: well developed well nourished, in no acute distress  Neck: supple, trachea midline  Lungs: clear, no wheeze/rhonchi  Cardiovascular: regular rate and rhythm, S1 S2  Abdomen: soft, mildly tender, ND, bowel sounds normal  Neurological:  alert and oriented x3  Skin: no rash  Extremities: no cyanosis/clubbing/edema    Labs:                         13.7   x     )-----------( x        ( 12 Jan 2025 16:58 )             38.9   01-12    139  |  109[H]  |  9   ----------------------------<  119[H]  3.8   |  23  |  0.87    Ca    7.7[L]      12 Jan 2025 07:08  Phos  2.7     01-12  Mg     1.6     01-12    TPro  6.1  /  Alb  3.1[L]  /  TBili  0.6  /  DBili  x   /  AST  18  /  ALT  19  /  AlkPhos  85  01-12      RECENT CULTURES:      RADIOLOGY & ADDITIONAL STUDIES:    ACC: [id number] EXAM:  CT ABDOMEN AND PELVIS IC   ORDERED BY: JEROME MANJARREZ     PROCEDURE DATE:  01/12/2025          INTERPRETATION:  CLINICAL INFORMATION: Nausea, vomiting, diarrhea, blood   in stool    COMPARISON: None.    CONTRAST/COMPLICATIONS:  IV Contrast: Omnipaque 350  90 cc administered   10 cc discarded  Oral Contrast: NONE  .    PROCEDURE:  CT of the Abdomen and Pelvis was performed.  Sagittal and coronal reformats were performed.    FINDINGS:  LOWER CHEST: Within normal limits.    LIVER: Within normal limits.  BILE DUCTS: Normal caliber.  GALLBLADDER: Within normal limits.  SPLEEN: Within normal limits.  PANCREAS: Within normal limits.  ADRENALS: Within normal limits.  KIDNEYS/URETERS: Within normal limits.    BLADDER: Within normal limits.  REPRODUCTIVE ORGANS: Uterus and adnexa within normal limits.    BOWEL: Diffuse fluid-filled nondilated small bowel. No bowel obstruction.   Appendix is normal.  PERITONEUM/RETROPERITONEUM: Within normal limits.  VESSELS: Within normal limits. Limited evaluation for active   extravasation on this single phase examination.  LYMPH NODES: No lymphadenopathy.  ABDOMINAL WALL: Within normal limits.  BONES: Within normal limits.    IMPRESSION:  Fluid-filled nondilated loops of small bowel, which can be seen with   nonspecific enteritis.    Assessment :   25YO F PMH anxiety and depression admitted with infectious enteritis  Pt with recent antibiotic use so will need to consider Cdiff if GI pcr neg though low suspicion    Plan :   Change To Rocephin  Fu GI PCR  Fu cultures  Trend temps and cbc  Serial abd exams    Advance Directives- Full code  Current Medications are documented.   Drug-drug interactions reviewed.    Continue with present regiment .  Approptiate use of antibiotics and adverse effects reviewed.      I have discussed the above plan of care with patient/family in detail. They expressed understanding of the treatment plan . Risks, benefits and alternatives discussed in detail. I have asked if they have any questions or concerns and appropriately addressed them to the best of my ability .      > 45 minutes spent in direct patient care reviewing  the notes, lab data/ imaging , discussion with multidisciplinary team. All questions were addressed and answered to the best of my capacity .    Thank you for allowing me to participate in the care of your patient .      Navdeep Ash MD  Infectious Disease  [phone]

## 2025-01-12 NOTE — H&P ADULT - ASSESSMENT
24yr old F PMH  of anxiety and depression presents to ED for vomiting and bloody diarrhea that started earlier today after eating chicken teriyaki. Pt admitted for nausea/vomiting and bandemia.  24yr old F PMH  of anxiety and depression presents to ED for vomiting and bloody diarrhea that started earlier today after eating chicken teriyaki. Pt admitted for nausea/vomiting, blood streaked stools and bandemia.

## 2025-01-12 NOTE — CHART NOTE - NSCHARTNOTEFT_GEN_A_CORE
Hospitalist Attending Chart Update / Progress Note    Please see H&P written early today by Dr. Mortensen, for more information.     Pt seen, interviewed, and examined by me. Boyfriend at bedside- allowed to speak to him per patient  Still having bloody stools- all water and blood. last one right prior to this evaluation.   denies chest pain, lightheadedness, abdominal pain   No further nausea or vomiting     Admits to anxiety from being in hospital. Takes prozac and at home occasionally atarax    T(C): 37.4 (01-12-25 @ 08:25), Max: 37.4 (01-12-25 @ 05:33)  HR: 120 (01-12-25 @ 08:25) (118 - 142)  BP: 126/87 (01-12-25 @ 08:25) (126/87 - 135/90)  RR: 18 (01-12-25 @ 08:25) (18 - 19)  SpO2: 98% (01-12-25 @ 08:25) (96% - 98%)    General: Well developed, well nourished, NAD  HEENT: NCAT, EOMI bl, moist mucous membranes   Neurology: A&Ox3, nonfocal   Respiratory: CTA B/L, No W/R/R  CV: tachycardic, +S1/S2, no murmurs, rubs or gallops  Abdominal: Soft, NT, ND +BSx4  Extremities: No C/C/E, + peripheral pulses  Skin: warm, dry, normal color    #Sepsis   #Enteritis   #GI bleed   - + Bandemia, +Tachycardia   - f/u GI PCR  - Continue zosyn   - ID/GI consulted  - continues to have blood BMs. Trend H&H  -  keep active type and screen.     #Anxiety  - continue prozac, start hydroxyzine prn anxiety    remainder of plan per H&P      Time spent: 40min

## 2025-01-12 NOTE — ED ADULT NURSE NOTE - OBJECTIVE STATEMENT
Received pt awake and alert, c/o vomiting and diarrhea started yesterday afternoon after eating japanese food.

## 2025-01-12 NOTE — ED ADULT TRIAGE NOTE - CHIEF COMPLAINT QUOTE
pt. came in from home, ambulatory, w/ c/o diarrhea and vomiting started yesterday afternoon, pt. state that she had japanese food prior she had the symptoms, known w/ anxiety, on Prozac, took Pepto bismol around 1700H yesterday, alert and oriented.

## 2025-01-12 NOTE — ED PROVIDER NOTE - CLINICAL SUMMARY MEDICAL DECISION MAKING FREE TEXT BOX
24-year-old female no significant past medical history complaining of nausea vomiting diarrhea since this afternoon states around 7 episodes each with the last 3 episodes of diarrhea noting some blood in the stool.  Ate Japanese food for lunch but did not eat any sushi had a chicken teriyaki.  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  Denies any fever or chills.  Tried Pepto-Bismol without much relief.    r/o colitis diverticulitis, labs, CT a/p, IV fluids, antiemetics, reassess

## 2025-01-12 NOTE — ED ADULT NURSE NOTE - ABDOMEN
Contact your primary care doctor for medication approval and change. Do not take your potassium on days that you had used Antivert. soft

## 2025-01-12 NOTE — ED ADULT NURSE NOTE - NSFALLUNIVINTERV_ED_ALL_ED
Bed/Stretcher in lowest position, wheels locked, appropriate side rails in place/Call bell, personal items and telephone in reach/Instruct patient to call for assistance before getting out of bed/chair/stretcher/Non-slip footwear applied when patient is off stretcher/[place] to call system/Physically safe environment - no spills, clutter or unnecessary equipment/Purposeful proactive rounding/Room/bathroom lighting operational, light cord in reach

## 2025-01-12 NOTE — H&P ADULT - HISTORY OF PRESENT ILLNESS
24yr old F PMH  of anxiety and depression presents to ED for vomiting and bloody diarrhea that started earlier today. Pt reports she had chicken teriyaki early afternoon at the mall. After 2 hrs she started to have stomach upset with bloating, about 7 episodes of vomiting and is unable to keep any food down. Shortly after she started having bloody tinged stools which prompted her to come to ED. Denies HA, fevers, chills, chest pain, palpitations, SOB, cough, dysuria, hematuria. Pt was recently dx with UTI 2 weeks ago and completed course of cefdinir 300mg BID x7 days.       ED course:  Vitals: BP: 135/90 , HR: 142 , Temp: 99.1, RR: 12, SpO2: 96% on RA   Labs significant for: WBC 11.33, Neutrophil: 10.88, band neutrophils 14.0%  EKG: pending   In ED given Zosyn 3.375g x1, NS 1L x2     Imaging  CT a/p: Fluid-filled nondilated loops of small bowel, which can be seen with   nonspecific enteritis.

## 2025-01-13 ENCOUNTER — TRANSCRIPTION ENCOUNTER (OUTPATIENT)
Age: 25
End: 2025-01-13

## 2025-01-13 VITALS
HEART RATE: 94 BPM | TEMPERATURE: 99 F | SYSTOLIC BLOOD PRESSURE: 107 MMHG | DIASTOLIC BLOOD PRESSURE: 71 MMHG | OXYGEN SATURATION: 95 % | RESPIRATION RATE: 19 BRPM

## 2025-01-13 LAB
ALBUMIN SERPL ELPH-MCNC: 2.9 G/DL — LOW (ref 3.3–5)
ALP SERPL-CCNC: 82 U/L — SIGNIFICANT CHANGE UP (ref 40–120)
ALT FLD-CCNC: 21 U/L — SIGNIFICANT CHANGE UP (ref 12–78)
ANION GAP SERPL CALC-SCNC: 4 MMOL/L — LOW (ref 5–17)
AST SERPL-CCNC: 17 U/L — SIGNIFICANT CHANGE UP (ref 15–37)
BASOPHILS # BLD AUTO: 0.03 K/UL — SIGNIFICANT CHANGE UP (ref 0–0.2)
BASOPHILS NFR BLD AUTO: 0.4 % — SIGNIFICANT CHANGE UP (ref 0–2)
BILIRUB SERPL-MCNC: 0.3 MG/DL — SIGNIFICANT CHANGE UP (ref 0.2–1.2)
BUN SERPL-MCNC: 7 MG/DL — SIGNIFICANT CHANGE UP (ref 7–23)
CALCIUM SERPL-MCNC: 8.3 MG/DL — LOW (ref 8.5–10.1)
CHLORIDE SERPL-SCNC: 110 MMOL/L — HIGH (ref 96–108)
CO2 SERPL-SCNC: 26 MMOL/L — SIGNIFICANT CHANGE UP (ref 22–31)
CREAT SERPL-MCNC: 0.7 MG/DL — SIGNIFICANT CHANGE UP (ref 0.5–1.3)
EGFR: 125 ML/MIN/1.73M2 — SIGNIFICANT CHANGE UP
EOSINOPHIL # BLD AUTO: 0.06 K/UL — SIGNIFICANT CHANGE UP (ref 0–0.5)
EOSINOPHIL NFR BLD AUTO: 0.7 % — SIGNIFICANT CHANGE UP (ref 0–6)
GLUCOSE SERPL-MCNC: 88 MG/DL — SIGNIFICANT CHANGE UP (ref 70–99)
HCT VFR BLD CALC: 41.3 % — SIGNIFICANT CHANGE UP (ref 34.5–45)
HGB BLD-MCNC: 14.1 G/DL — SIGNIFICANT CHANGE UP (ref 11.5–15.5)
IMM GRANULOCYTES NFR BLD AUTO: 0.5 % — SIGNIFICANT CHANGE UP (ref 0–0.9)
LYMPHOCYTES # BLD AUTO: 1.59 K/UL — SIGNIFICANT CHANGE UP (ref 1–3.3)
LYMPHOCYTES # BLD AUTO: 18.9 % — SIGNIFICANT CHANGE UP (ref 13–44)
MAGNESIUM SERPL-MCNC: 2.6 MG/DL — SIGNIFICANT CHANGE UP (ref 1.6–2.6)
MCHC RBC-ENTMCNC: 30.5 PG — SIGNIFICANT CHANGE UP (ref 27–34)
MCHC RBC-ENTMCNC: 34.1 G/DL — SIGNIFICANT CHANGE UP (ref 32–36)
MCV RBC AUTO: 89.4 FL — SIGNIFICANT CHANGE UP (ref 80–100)
MONOCYTES # BLD AUTO: 1.01 K/UL — HIGH (ref 0–0.9)
MONOCYTES NFR BLD AUTO: 12 % — SIGNIFICANT CHANGE UP (ref 2–14)
NEUTROPHILS # BLD AUTO: 5.68 K/UL — SIGNIFICANT CHANGE UP (ref 1.8–7.4)
NEUTROPHILS NFR BLD AUTO: 67.5 % — SIGNIFICANT CHANGE UP (ref 43–77)
NRBC # BLD: 0 /100 WBCS — SIGNIFICANT CHANGE UP (ref 0–0)
PHOSPHATE SERPL-MCNC: 1.3 MG/DL — LOW (ref 2.5–4.5)
PLATELET # BLD AUTO: 255 K/UL — SIGNIFICANT CHANGE UP (ref 150–400)
POTASSIUM SERPL-MCNC: 3.6 MMOL/L — SIGNIFICANT CHANGE UP (ref 3.5–5.3)
POTASSIUM SERPL-SCNC: 3.6 MMOL/L — SIGNIFICANT CHANGE UP (ref 3.5–5.3)
PROT SERPL-MCNC: 6.2 G/DL — SIGNIFICANT CHANGE UP (ref 6–8.3)
RBC # BLD: 4.62 M/UL — SIGNIFICANT CHANGE UP (ref 3.8–5.2)
RBC # FLD: 12.4 % — SIGNIFICANT CHANGE UP (ref 10.3–14.5)
SODIUM SERPL-SCNC: 140 MMOL/L — SIGNIFICANT CHANGE UP (ref 135–145)
WBC # BLD: 8.41 K/UL — SIGNIFICANT CHANGE UP (ref 3.8–10.5)
WBC # FLD AUTO: 8.41 K/UL — SIGNIFICANT CHANGE UP (ref 3.8–10.5)

## 2025-01-13 PROCEDURE — 84100 ASSAY OF PHOSPHORUS: CPT

## 2025-01-13 PROCEDURE — 99285 EMERGENCY DEPT VISIT HI MDM: CPT | Mod: 25

## 2025-01-13 PROCEDURE — 99239 HOSP IP/OBS DSCHRG MGMT >30: CPT | Mod: GC

## 2025-01-13 PROCEDURE — 96374 THER/PROPH/DIAG INJ IV PUSH: CPT

## 2025-01-13 PROCEDURE — 83735 ASSAY OF MAGNESIUM: CPT

## 2025-01-13 PROCEDURE — 80053 COMPREHEN METABOLIC PANEL: CPT

## 2025-01-13 PROCEDURE — 74177 CT ABD & PELVIS W/CONTRAST: CPT | Mod: MC

## 2025-01-13 PROCEDURE — 85025 COMPLETE CBC W/AUTO DIFF WBC: CPT

## 2025-01-13 PROCEDURE — 96375 TX/PRO/DX INJ NEW DRUG ADDON: CPT

## 2025-01-13 PROCEDURE — 36415 COLL VENOUS BLD VENIPUNCTURE: CPT

## 2025-01-13 PROCEDURE — 93005 ELECTROCARDIOGRAM TRACING: CPT

## 2025-01-13 RX ORDER — SODIUM PHOSPHATE, MONOBASIC, MONOHYDRATE AND SODIUM PHOSPHATE, DIBASIC ANHYDROUS 142; 276 MG/ML; MG/ML
30 INJECTION, SOLUTION INTRAVENOUS ONCE
Refills: 0 | Status: COMPLETED | OUTPATIENT
Start: 2025-01-13 | End: 2025-01-13

## 2025-01-13 RX ORDER — SOD PHOS DI, MONO/K PHOS MONO 250 MG
1 TABLET ORAL ONCE
Refills: 0 | Status: COMPLETED | OUTPATIENT
Start: 2025-01-13 | End: 2025-01-13

## 2025-01-13 RX ADMIN — Medication 1 PACKET(S): at 13:40

## 2025-01-13 RX ADMIN — Medication 20 MILLIGRAM(S): at 11:38

## 2025-01-13 RX ADMIN — SODIUM PHOSPHATE, MONOBASIC, MONOHYDRATE AND SODIUM PHOSPHATE, DIBASIC ANHYDROUS 85 MILLIMOLE(S): 142; 276 INJECTION, SOLUTION INTRAVENOUS at 11:38

## 2025-01-13 RX ADMIN — CEFTRIAXONE SODIUM 100 MILLIGRAM(S): 1 INJECTION, POWDER, FOR SOLUTION INTRAMUSCULAR; INTRAVENOUS at 05:12

## 2025-01-13 RX ADMIN — Medication 30 MILLILITER(S): at 08:47

## 2025-01-13 NOTE — PROGRESS NOTE ADULT - SUBJECTIVE AND OBJECTIVE BOX
[facility]    Alistair Mujica NP    [address] [place] [address] [phone]      Chief Complaint:  Patient is a 24y old  Female who presents with a chief complaint of nausea and vomiting, bandemia (13 Jan 2025 10:07)      HPI/ 24 hr events:   Patient seen and examined at bedside  Reports feeling well this morning   Had few soft/brown stools overnight, no further episodes of hematochezia  Tolerating diet  No fever, chills, nausea, vomiting or abdominal pain      REVIEW OF SYSTEMS:   General: Negative  HEENT: Negative  CV: Negative  Respiratory: Negative  GI: See HPI  : Negative  MSK: Negative  Hematologic: Negative  Skin: Negative    MEDICATIONS:   MEDICATIONS  (STANDING):  FLUoxetine 20 milliGRAM(s) Oral daily  sodium chloride 0.9%. 1000 milliLiter(s) (100 mL/Hr) IV Continuous <Continuous>  sodium phosphate 30 milliMole(s)/500 mL IVPB 30 milliMole(s) IV Intermittent once    MEDICATIONS  (PRN):  acetaminophen     Tablet .. 650 milliGRAM(s) Oral every 6 hours PRN Temp greater or equal to 38C (100.4F), Mild Pain (1 - 3)  aluminum hydroxide/magnesium hydroxide/simethicone Suspension 30 milliLiter(s) Oral every 4 hours PRN Dyspepsia  hydrOXYzine hydrochloride 25 milliGRAM(s) Oral every 6 hours PRN Anxiety  melatonin 3 milliGRAM(s) Oral at bedtime PRN Insomnia  ondansetron Injectable 4 milliGRAM(s) IV Push every 8 hours PRN Nausea and/or Vomiting      ALLERGIES:   Allergies    Macrobid (Other (Mild to Mod))    Intolerances        VITAL SIGNS:   Vital Signs Last 24 Hrs  T(C): 36.9 (13 Jan 2025 05:56), Max: 37.2 (12 Jan 2025 20:11)  T(F): 98.4 (13 Jan 2025 05:56), Max: 98.9 (12 Jan 2025 20:11)  HR: 89 (13 Jan 2025 05:56) (89 - 109)  BP: 107/71 (13 Jan 2025 05:56) (107/71 - 120/80)  BP(mean): --  RR: 18 (13 Jan 2025 05:56) (18 - 18)  SpO2: 97% (13 Jan 2025 05:56) (95% - 97%)    Parameters below as of 13 Jan 2025 05:56  Patient On (Oxygen Delivery Method): room air      I&O's Summary      PHYSICAL EXAM:   GENERAL:  No acute distress  HEENT:  NC/AT  CHEST:  No increased effort  HEART:  Regular rate  ABDOMEN:  Soft, non-tender, non-distended  EXTREMITIES: No cyanosis  SKIN:  Warm, dry  NEURO:  Calm, cooperative    LABS:                        14.1   8.41  )-----------( 255      ( 13 Jan 2025 08:20 )             41.3     01-13    140  |  110[H]  |  7   ----------------------------<  88  3.6   |  26  |  0.70    Ca    8.3[L]      13 Jan 2025 08:20  Phos  1.3     01-13  Mg     2.6     01-13    TPro  6.2  /  Alb  2.9[L]  /  TBili  0.3  /  DBili  x   /  AST  17  /  ALT  21  /  AlkPhos  82  01-13    LIVER FUNCTIONS - ( 13 Jan 2025 08:20 )  Alb: 2.9 g/dL / Pro: 6.2 g/dL / ALK PHOS: 82 U/L / ALT: 21 U/L / AST: 17 U/L / GGT: x                               GI PCR Panel: Detected (01-12-25 @ 08:00)                RADIOLOGY & ADDITIONAL STUDIES:  
[facility]  Elroy Hamlin MD PhD, Donna Jhaveri MD, Yolanda Sesay MD, Yuli Alves MD, Kalpesh Blair MD  and providing coverage with Navdeep Ash MD  Providing Infectious Disease Consultations at [facility], [facility], [facility], [facility]'s    Office# [phone] to schedule follow up appointments  Answering Service for urgent calls or New Consults [phone]  Cell# to text for urgent issues Elroy Hamlin [phone]     infectious diseases progress note:    ZANDRA BARBOSA is a 24y y. o. Female patient    Overnight and events of the last 24hrs reviewed    Allergies    Macrobid (Other (Mild to Mod))    Intolerances        ANTIBIOTICS/RELEVANT:  antimicrobials    immunologic:    OTHER:  acetaminophen     Tablet .. 650 milliGRAM(s) Oral every 6 hours PRN  aluminum hydroxide/magnesium hydroxide/simethicone Suspension 30 milliLiter(s) Oral every 4 hours PRN  FLUoxetine 20 milliGRAM(s) Oral daily  hydrOXYzine hydrochloride 25 milliGRAM(s) Oral every 6 hours PRN  melatonin 3 milliGRAM(s) Oral at bedtime PRN  ondansetron Injectable 4 milliGRAM(s) IV Push every 8 hours PRN  sodium chloride 0.9%. 1000 milliLiter(s) IV Continuous <Continuous>      Objective:  Vital Signs Last 24 Hrs  T(C): 36.9 (13 Jan 2025 05:56), Max: 37.2 (12 Jan 2025 20:11)  T(F): 98.4 (13 Jan 2025 05:56), Max: 98.9 (12 Jan 2025 20:11)  HR: 89 (13 Jan 2025 05:56) (89 - 109)  BP: 107/71 (13 Jan 2025 05:56) (107/71 - 120/80)  BP(mean): --  RR: 18 (13 Jan 2025 05:56) (18 - 18)  SpO2: 97% (13 Jan 2025 05:56) (95% - 97%)    Parameters below as of 13 Jan 2025 05:56  Patient On (Oxygen Delivery Method): room air        T(C): 36.9 (01-13-25 @ 05:56), Max: 37.4 (01-12-25 @ 05:33)  T(C): 36.9 (01-13-25 @ 05:56), Max: 37.4 (01-12-25 @ 05:33)  T(C): 36.9 (01-13-25 @ 05:56), Max: 37.4 (01-12-25 @ 05:33)    PHYSICAL EXAM:  HEENT: NC atraumatic  Neck: supple  Respiratory: no accessory muscle use, breathing comfortably  Cardiovascular: distant  Gastrointestinal: normal appearing, nondistended  Extremities: no clubbing, no cyanosis,        LABS:                          14.1   8.41  )-----------( 255      ( 13 Jan 2025 08:20 )             41.3       WBC  8.41 01-13 @ 08:20  9.45 01-12 @ 07:08  11.33 01-12 @ 03:55      01-13    140  |  110[H]  |  7   ----------------------------<  88  3.6   |  26  |  0.70    Ca    8.3[L]      13 Jan 2025 08:20  Phos  1.3     01-13  Mg     2.6     01-13    TPro  6.2  /  Alb  2.9[L]  /  TBili  0.3  /  DBili  x   /  AST  17  /  ALT  21  /  AlkPhos  82  01-13      Creatinine: 0.70 mg/dL (01-13-25 @ 08:20)  Creatinine: 0.87 mg/dL (01-12-25 @ 07:08)  Creatinine: 0.94 mg/dL (01-12-25 @ 03:55)        Urinalysis Basic - ( 13 Jan 2025 08:20 )    Color: x / Appearance: x / SG: x / pH: x  Gluc: 88 mg/dL / Ketone: x  / Bili: x / Urobili: x   Blood: x / Protein: x / Nitrite: x   Leuk Esterase: x / RBC: x / WBC x   Sq Epi: x / Non Sq Epi: x / Bacteria: x            INFLAMMATORY MARKERS      MICROBIOLOGY:    GI PCR Panel Stool (01.12.25 @ 08:00)    GI PCR Panel: Detected: GI Panel PCR evaluates for:  Campylobacter, Plesiomonas shigelloides, Salmonella, Vibrio, Yersinia  enterocolitica, Enteroaggregative Escherichia (EAEC), Enteropathogenic E.  coli (EPEC), Enterotoxigenic E. coli (ETEC), Shiga-like toxin producing  E.coli (STEC), E. coli O157, Shigella/Enteroinvasive E. coli (EIEC),  Adenovirus, Astrovirus, Norovirus, Rotavirus, Sapovirus, Cryptosporidium,  Cyclospora cayetanensis, Entamoeba histolytica, Giardia lamblia.  For culture and susceptibility reports refer to "reflex stool culture".   Norovirus GI/GII: Detected        RADIOLOGY & ADDITIONAL STUDIES:

## 2025-01-13 NOTE — DISCHARGE NOTE PROVIDER - NSDCCPCAREPLAN_GEN_ALL_CORE_FT
PRINCIPAL DISCHARGE DIAGNOSIS  Diagnosis: Nausea vomiting and diarrhea  Assessment and Plan of Treatment: You came in with nausea, vomiting, diarrhea and bloody stool. You were diagnosed with Norovirus. This is a highly contagious virus that causes GI symptoms. To prevent spread please wash hands frequently with soap and water. Use hand  when unable to use soap and water. Disinfect surfaces that have been contaminated. If possible do not share bathroom with household members while symptomatic and for a couple days following resolution of symptoms. Continue to drink plenty of fluids to avoid becoming dehydrated.      SECONDARY DISCHARGE DIAGNOSES  Diagnosis: Bandemia  Assessment and Plan of Treatment:      PRINCIPAL DISCHARGE DIAGNOSIS  Diagnosis: Nausea vomiting and diarrhea  Assessment and Plan of Treatment: You came in with nausea, vomiting, diarrhea and bloody stool. You were diagnosed with Norovirus. This is a highly contagious virus that causes GI symptoms. To prevent spread please wash hands frequently with soap and water. Use hand  when unable to use soap and water. Disinfect surfaces that have been contaminated. If possible do not share bathroom with household members while symptomatic and for a couple days following resolution of symptoms. Continue to drink plenty of fluids to avoid becoming dehydrated.

## 2025-01-13 NOTE — DISCHARGE NOTE NURSING/CASE MANAGEMENT/SOCIAL WORK - PATIENT PORTAL LINK FT
You can access the FollowMyHealth Patient Portal offered by [facility] by registering at the following website: [email or web address]. By joining [facility]’s FollowMyHealth portal, you will also be able to view your health information using other applications (apps) compatible with our system.

## 2025-01-13 NOTE — PROGRESS NOTE ADULT - ASSESSMENT
23YO F w anxiety and depression admitted with vomiting and diarrhea  GI PCR Panel Stool (01.12.25 @ 08:00)  Norovirus GI/GII: Detected    RECOMMENDATIONS  Norovirus supportive care, no abx, discussed with pt transmission risks, need for soap and water rather than alcohol based hand santizers  pt now tolerating PO so can look at dc    From an ID standpoint no further requirement for inpatient status for the management of ID issues. Fine with discharge from ID standpoint when other medical issues no longer require inpatient care and social issues allow for a safe discharge plan. To schedule an outpatient ID follow up appointment please call our office at [phone]    Thank you for consulting us and involving us in the management of this most interesting and challenging case.  In addition to reviewing history, imaging, documents, labs, microbiology, took into account antibiotic stewardship, local antibiogram and infection control strategies and potential transmission issues.    We will follow along in the care of this patient. Please contact me by texting me directly on my cell# at [phone] using TEAMS or call our answering service at [phone] with any concerns.  
Hematochezia  Diarrhea  Nausea/vomiting     Recommendations:  - CT AP noted & d/w patient, +enteritis  - GI PCR +Norovirus  - Supportive care  - Diet as tolerated  - Monitor stools & GI function  - Zofran PRN  - Given patient tolerating diet & stools improving, no objection from GI standpoint for d/c planning      I reviewed the overnight course of events on the unit, re-confirming the patient history. I discussed the care with the patient  Differential diagnosis and plan of care discussed with patient after the evaluation  35 minutes spent on total encounter of which more than fifty percent of the encounter was spent counseling and/or coordinating care by the attending physician.

## 2025-01-13 NOTE — DISCHARGE NOTE NURSING/CASE MANAGEMENT/SOCIAL WORK - FINANCIAL ASSISTANCE
[facility] provides services at a reduced cost to those who are determined to be eligible through [facility]’s financial assistance program. Information regarding [facility]’s financial assistance program can be found by going to [email or web address] or by calling [phone].

## 2025-01-13 NOTE — DISCHARGE NOTE PROVIDER - ATTENDING DISCHARGE PHYSICAL EXAMINATION:
PHYSICAL EXAM ON DAY OF DISCHARGE:  Vital Signs Last 24 Hrs  T(C): 36.9 (13 Jan 2025 05:56), Max: 37.2 (12 Jan 2025 20:11)  T(F): 98.4 (13 Jan 2025 05:56), Max: 98.9 (12 Jan 2025 20:11)  HR: 89 (13 Jan 2025 05:56) (89 - 109)  BP: 107/71 (13 Jan 2025 05:56) (107/71 - 120/80)  BP(mean): --  RR: 18 (13 Jan 2025 05:56) (18 - 18)  SpO2: 97% (13 Jan 2025 05:56) (95% - 97%)    Parameters below as of 13 Jan 2025 05:56  Patient On (Oxygen Delivery Method): room air    General: Well developed, well nourished, NAD  HEENT: NCAT, EOMI bl, moist mucous membranes   Neck: Supple, nontender, no mass  Neurology: A&Ox3, nonfocal  Abdominal: Soft, NT, ND +BSx4  Extremities: No C/C/E, + peripheral pulses  Skin: warm, dry, normal color

## 2025-01-13 NOTE — DISCHARGE NOTE PROVIDER - CARE PROVIDER_API CALL
Deion Reese  [address] [address]  Phone: [phone]  Fax: [phone]  Follow Up Time:

## 2025-01-13 NOTE — DISCHARGE NOTE PROVIDER - HOSPITAL COURSE
HPI:  24yr old F PMH  of anxiety and depression presents to ED for vomiting and bloody diarrhea that started earlier today. Pt reports she had chicken teriyaki early [place] the mall. After 2 hrs she started to have stomach upset with bloating, about 7 episodes of vomiting and is unable to keep any food down. Shortly after she started having bloody tinged stools which prompted her to come to ED. Denies HA, fevers, chills, chest pain, palpitations, SOB, cough, dysuria, hematuria. Pt was recently dx with UTI 2 weeks ago and completed course of cefdinir 300mg BID x7 days.       ED course:  Vitals: BP: 135/90 , HR: 142 , Temp: 99.1, RR: 12, SpO2: 96% on RA   Labs significant for: WBC 11.33, Neutrophil: 10.88, band neutrophils 14.0%  EKG: pending   In ED given Zosyn 3.375g x1, NS 1L x2     Imaging  CT a/p: Fluid-filled nondilated loops of small bowel, which can be seen with   nonspecific enteritis.   (12 Jan 2025 05:45)      ---  HOSPITAL COURSE: Patient admitted to medicine floor for management of enteritis. CT A/P showed < from: CT Abdomen and Pelvis w/ IV Cont (01.12.25 @ 04:53) >    Fluid-filled nondilated loops of small bowel, which can be seen with   nonspecific enteritis.    < end of copied text >  GI PCR displayed Norovirus. Pt treated with IV fluids, PRN Zofran. Abx were initially Zosyn and then transitioned to Rocephin per ID recs.       Pt seen and examined on day of discharge. Patient is medically optimized for discharge to home with close outpatient followup.    PHYSICAL EXAM ON DAY OF DISCHARGE:  The patient was seen and examined on the day of discharge. Please see progress note from day of discharge for further information.         ---  CONSULTANTS:   GI Dr Bernard  ID Dr Ash  ---  TIME SPENT:  I, the attending physician, was physically present for the key portions of the evaluation and management (E/M) service provided. The total amount of time spent reviewing the hospital notes, laboratory values, imaging findings, assessing/counseling the patient, discussing with consultant physicians, social work, nursing staff was -- minutes    ---  Primary care provider was made aware of plan for discharge:      [  ] NO     [  ] YES   HPI:  24yr old F PMH  of anxiety and depression presents to ED for vomiting and bloody diarrhea that started earlier today. Pt reports she had chicken teriyaki early [place] the [place]. After 2 hrs she started to have stomach upset with bloating, about 7 episodes of vomiting and is unable to keep any food down. Shortly after she started having bloody tinged stools which prompted her to come to ED. Denies HA, fevers, chills, chest pain, palpitations, SOB, cough, dysuria, hematuria. Pt was recently dx with UTI 2 weeks ago and completed course of cefdinir 300mg BID x7 days.       ED course:  Vitals: BP: 135/90 , HR: 142 , Temp: 99.1, RR: 12, SpO2: 96% on RA   Labs significant for: WBC 11.33, Neutrophil: 10.88, band neutrophils 14.0%  EKG: pending   In ED given Zosyn 3.375g x1, NS 1L x2     Imaging  CT a/p: Fluid-filled nondilated loops of small bowel, which can be seen with   nonspecific enteritis.   (12 Jan 2025 05:45)      ---  HOSPITAL COURSE: Patient admitted to medicine floor for management of enteritis. CT with Fluid-filled nondilated loops of small bowel, which can be seen with   nonspecific enteritis.  GI PCR displayed Norovirus. Pt treated with IV fluids, PRN Zofran. Abx were initially Zosyn and then transitioned to Rocephin per ID recs. d/c antibiotics.   Pt with no further bleeding. Stool became more formed. Tolerating diet      Pt seen and examined on day of discharge. Patient is medically optimized for discharge to home with close outpatient followup.    PHYSICAL EXAM ON DAY OF DISCHARGE:  Vital Signs Last 24 Hrs  T(C): 36.9 (13 Jan 2025 05:56), Max: 37.2 (12 Jan 2025 20:11)  T(F): 98.4 (13 Jan 2025 05:56), Max: 98.9 (12 Jan 2025 20:11)  HR: 89 (13 Jan 2025 05:56) (89 - 109)  BP: 107/71 (13 Jan 2025 05:56) (107/71 - 120/80)  BP(mean): --  RR: 18 (13 Jan 2025 05:56) (18 - 18)  SpO2: 97% (13 Jan 2025 05:56) (95% - 97%)    Parameters below as of 13 Jan 2025 05:56  Patient On (Oxygen Delivery Method): room air    General: Well developed, well nourished, NAD  HEENT: NCAT, EOMI bl, moist mucous membranes   Neck: Supple, nontender, no mass  Neurology: A&Ox3, nonfocal  Abdominal: Soft, NT, ND +BSx4  Extremities: No C/C/E, + peripheral pulses  Skin: warm, dry, normal color        ---  CONSULTANTS:   OSCAR Bernard  ID Dr Ash  ---

## 2025-02-11 ENCOUNTER — RESULT REVIEW (OUTPATIENT)
Age: 25
End: 2025-02-11

## 2025-02-11 ENCOUNTER — APPOINTMENT (OUTPATIENT)
Dept: ULTRASOUND IMAGING | Facility: IMAGING CENTER | Age: 25
End: 2025-02-11
Payer: COMMERCIAL

## 2025-02-11 ENCOUNTER — OUTPATIENT (OUTPATIENT)
Dept: OUTPATIENT SERVICES | Facility: HOSPITAL | Age: 25
LOS: 1 days | End: 2025-02-11
Payer: COMMERCIAL

## 2025-02-11 DIAGNOSIS — Z00.8 ENCOUNTER FOR OTHER GENERAL EXAMINATION: ICD-10-CM

## 2025-02-11 PROCEDURE — 88172 CYTP DX EVAL FNA 1ST EA SITE: CPT

## 2025-02-11 PROCEDURE — 10005 FNA BX W/US GDN 1ST LES: CPT

## 2025-02-11 PROCEDURE — 88173 CYTOPATH EVAL FNA REPORT: CPT | Mod: 26

## 2025-02-11 PROCEDURE — 88173 CYTOPATH EVAL FNA REPORT: CPT

## 2025-03-04 ENCOUNTER — LABORATORY RESULT (OUTPATIENT)
Age: 25
End: 2025-03-04

## 2025-03-04 ENCOUNTER — APPOINTMENT (OUTPATIENT)
Dept: SURGERY | Facility: CLINIC | Age: 25
End: 2025-03-04
Payer: COMMERCIAL

## 2025-03-04 VITALS
DIASTOLIC BLOOD PRESSURE: 88 MMHG | HEART RATE: 94 BPM | SYSTOLIC BLOOD PRESSURE: 135 MMHG | HEIGHT: 63 IN | BODY MASS INDEX: 28 KG/M2 | WEIGHT: 158 LBS

## 2025-03-04 DIAGNOSIS — Z78.9 OTHER SPECIFIED HEALTH STATUS: ICD-10-CM

## 2025-03-04 DIAGNOSIS — Z80.3 FAMILY HISTORY OF MALIGNANT NEOPLASM OF BREAST: ICD-10-CM

## 2025-03-04 DIAGNOSIS — Z80.1 FAMILY HISTORY OF MALIGNANT NEOPLASM OF TRACHEA, BRONCHUS AND LUNG: ICD-10-CM

## 2025-03-04 DIAGNOSIS — C73 MALIGNANT NEOPLASM OF THYROID GLAND: ICD-10-CM

## 2025-03-04 PROCEDURE — 99204 OFFICE O/P NEW MOD 45 MIN: CPT | Mod: 25

## 2025-03-04 PROCEDURE — 36415 COLL VENOUS BLD VENIPUNCTURE: CPT

## 2025-03-04 RX ORDER — FLUOXETINE HYDROCHLORIDE 20 MG/1
20 CAPSULE ORAL
Refills: 0 | Status: ACTIVE | COMMUNITY

## 2025-03-04 RX ORDER — HYDROXYZINE HYDROCHLORIDE 50 MG/1
TABLET ORAL
Refills: 0 | Status: ACTIVE | COMMUNITY

## 2025-03-05 LAB
CALCIUM SERPL-MCNC: 9.4 MG/DL
CALCIUM SERPL-MCNC: 9.4 MG/DL
PARATHYROID HORMONE INTACT: 30 PG/ML
T3 SERPL-MCNC: 177 NG/DL
T4 FREE SERPL-MCNC: 1.3 NG/DL
THYROGLOB AB SERPL-ACNC: <15 IU/ML
THYROGLOB SERPL-MCNC: 34.1 NG/ML
TSH SERPL-ACNC: 2.27 UIU/ML

## 2025-07-25 ENCOUNTER — TRANSCRIPTION ENCOUNTER (OUTPATIENT)
Age: 25
End: 2025-07-25

## 2025-07-29 ENCOUNTER — OUTPATIENT (OUTPATIENT)
Dept: OUTPATIENT SERVICES | Facility: HOSPITAL | Age: 25
LOS: 1 days | End: 2025-07-29

## 2025-07-29 VITALS
HEART RATE: 87 BPM | HEIGHT: 62 IN | OXYGEN SATURATION: 97 % | TEMPERATURE: 98 F | WEIGHT: 162.92 LBS | SYSTOLIC BLOOD PRESSURE: 119 MMHG | DIASTOLIC BLOOD PRESSURE: 82 MMHG | RESPIRATION RATE: 14 BRPM

## 2025-07-29 DIAGNOSIS — K08.409 PARTIAL LOSS OF TEETH, UNSPECIFIED CAUSE, UNSPECIFIED CLASS: Chronic | ICD-10-CM

## 2025-07-29 DIAGNOSIS — C73 MALIGNANT NEOPLASM OF THYROID GLAND: ICD-10-CM

## 2025-07-29 RX ORDER — SODIUM CHLORIDE 9 G/1000ML
1000 INJECTION, SOLUTION INTRAVENOUS
Refills: 0 | Status: DISCONTINUED | OUTPATIENT
Start: 2025-08-11 | End: 2025-08-12

## 2025-08-11 ENCOUNTER — APPOINTMENT (OUTPATIENT)
Dept: SURGERY | Facility: HOSPITAL | Age: 25
End: 2025-08-11
Payer: COMMERCIAL

## 2025-08-11 ENCOUNTER — RESULT REVIEW (OUTPATIENT)
Age: 25
End: 2025-08-11

## 2025-08-11 ENCOUNTER — INPATIENT (INPATIENT)
Facility: HOSPITAL | Age: 25
LOS: 0 days | Discharge: HOME | End: 2025-08-12
Attending: SPECIALIST | Admitting: SPECIALIST
Payer: COMMERCIAL

## 2025-08-11 ENCOUNTER — TRANSCRIPTION ENCOUNTER (OUTPATIENT)
Age: 25
End: 2025-08-11

## 2025-08-11 VITALS — HEIGHT: 62 IN | WEIGHT: 162.92 LBS

## 2025-08-11 DIAGNOSIS — C73 MALIGNANT NEOPLASM OF THYROID GLAND: ICD-10-CM

## 2025-08-11 DIAGNOSIS — K08.409 PARTIAL LOSS OF TEETH, UNSPECIFIED CAUSE, UNSPECIFIED CLASS: Chronic | ICD-10-CM

## 2025-08-11 LAB — HCG UR QL: NEGATIVE — SIGNIFICANT CHANGE UP

## 2025-08-11 PROCEDURE — 13132 CMPLX RPR F/C/C/M/N/AX/G/H/F: CPT | Mod: 59

## 2025-08-11 PROCEDURE — 88307 TISSUE EXAM BY PATHOLOGIST: CPT | Mod: 26

## 2025-08-11 PROCEDURE — 88305 TISSUE EXAM BY PATHOLOGIST: CPT | Mod: 26

## 2025-08-11 PROCEDURE — 60252 REMOVAL OF THYROID: CPT

## 2025-08-11 DEVICE — LIGATING CLIPS WECK HORIZON SMALL-WIDE (RED) 24: Type: IMPLANTABLE DEVICE | Site: BILATERAL | Status: FUNCTIONAL

## 2025-08-11 DEVICE — LIGATING CLIPS WECK HORIZON MEDIUM (BLUE) 24: Type: IMPLANTABLE DEVICE | Site: BILATERAL | Status: FUNCTIONAL

## 2025-08-11 RX ORDER — HYDROMORPHONE/SOD CHLOR,ISO/PF 2 MG/10 ML
0.5 SYRINGE (ML) INJECTION
Refills: 0 | Status: DISCONTINUED | OUTPATIENT
Start: 2025-08-11 | End: 2025-08-12

## 2025-08-11 RX ORDER — ACETAMINOPHEN 500 MG/5ML
2 LIQUID (ML) ORAL
Qty: 0 | Refills: 0 | DISCHARGE
Start: 2025-08-11

## 2025-08-11 RX ORDER — OXYCODONE HYDROCHLORIDE 30 MG/1
5 TABLET ORAL EVERY 6 HOURS
Refills: 0 | Status: DISCONTINUED | OUTPATIENT
Start: 2025-08-11 | End: 2025-08-12

## 2025-08-11 RX ORDER — IBUPROFEN 200 MG
1 TABLET ORAL
Refills: 0 | DISCHARGE

## 2025-08-11 RX ORDER — HYDROXYZINE HYDROCHLORIDE 25 MG/1
2 TABLET, FILM COATED ORAL
Refills: 0 | DISCHARGE

## 2025-08-11 RX ORDER — LISDEXAMFETAMINE DIMESYLATE 20 MG/1
1 TABLET, CHEWABLE ORAL
Refills: 0 | DISCHARGE

## 2025-08-11 RX ORDER — CEPHALEXIN 250 MG/1
1 CAPSULE ORAL
Refills: 0 | DISCHARGE

## 2025-08-11 RX ORDER — OMEPRAZOLE 20 MG/1
1 CAPSULE, DELAYED RELEASE ORAL
Refills: 0 | DISCHARGE

## 2025-08-11 RX ORDER — ONDANSETRON HCL/PF 4 MG/2 ML
4 VIAL (ML) INJECTION ONCE
Refills: 0 | Status: DISCONTINUED | OUTPATIENT
Start: 2025-08-11 | End: 2025-08-12

## 2025-08-11 RX ORDER — FLUOXETINE HYDROCHLORIDE 20 MG/1
10 CAPSULE ORAL
Refills: 0 | Status: DISCONTINUED | OUTPATIENT
Start: 2025-08-11 | End: 2025-08-12

## 2025-08-11 RX ORDER — FLUOXETINE HYDROCHLORIDE 20 MG/1
3 CAPSULE ORAL
Refills: 0 | DISCHARGE

## 2025-08-11 RX ORDER — ACETAMINOPHEN 500 MG/5ML
1000 LIQUID (ML) ORAL EVERY 6 HOURS
Refills: 0 | Status: DISCONTINUED | OUTPATIENT
Start: 2025-08-11 | End: 2025-08-12

## 2025-08-11 RX ADMIN — OXYCODONE HYDROCHLORIDE 5 MILLIGRAM(S): 30 TABLET ORAL at 13:45

## 2025-08-11 RX ADMIN — OXYCODONE HYDROCHLORIDE 5 MILLIGRAM(S): 30 TABLET ORAL at 13:00

## 2025-08-11 RX ADMIN — Medication 1 LOZENGE: at 12:59

## 2025-08-11 RX ADMIN — Medication 0.5 MILLIGRAM(S): at 12:47

## 2025-08-11 RX ADMIN — Medication 1 LOZENGE: at 23:54

## 2025-08-11 RX ADMIN — OXYCODONE HYDROCHLORIDE 5 MILLIGRAM(S): 30 TABLET ORAL at 19:38

## 2025-08-11 RX ADMIN — OXYCODONE HYDROCHLORIDE 5 MILLIGRAM(S): 30 TABLET ORAL at 19:08

## 2025-08-11 RX ADMIN — Medication 0.5 MILLIGRAM(S): at 13:00

## 2025-08-11 RX ADMIN — Medication 1000 MILLIGRAM(S): at 23:09

## 2025-08-11 RX ADMIN — Medication 1000 MILLIGRAM(S): at 23:38

## 2025-08-12 ENCOUNTER — TRANSCRIPTION ENCOUNTER (OUTPATIENT)
Age: 25
End: 2025-08-12

## 2025-08-12 VITALS
HEART RATE: 81 BPM | DIASTOLIC BLOOD PRESSURE: 85 MMHG | SYSTOLIC BLOOD PRESSURE: 123 MMHG | TEMPERATURE: 98 F | RESPIRATION RATE: 18 BRPM | OXYGEN SATURATION: 100 %

## 2025-08-12 RX ORDER — FLUOXETINE HYDROCHLORIDE 20 MG/1
10 CAPSULE ORAL
Refills: 0 | Status: DISCONTINUED | OUTPATIENT
Start: 2025-08-12 | End: 2025-08-12

## 2025-08-12 RX ORDER — ACETAMINOPHEN 500 MG/5ML
1000 LIQUID (ML) ORAL EVERY 6 HOURS
Refills: 0 | Status: DISCONTINUED | OUTPATIENT
Start: 2025-08-12 | End: 2025-08-12

## 2025-08-12 RX ORDER — OXYCODONE HYDROCHLORIDE 30 MG/1
5 TABLET ORAL EVERY 6 HOURS
Refills: 0 | Status: DISCONTINUED | OUTPATIENT
Start: 2025-08-12 | End: 2025-08-12

## 2025-08-12 RX ADMIN — Medication 0.5 MILLIGRAM(S): at 02:51

## 2025-08-12 RX ADMIN — Medication 40 MILLIGRAM(S): at 06:45

## 2025-08-12 RX ADMIN — Medication 0.5 MILLIGRAM(S): at 03:19

## 2025-08-12 RX ADMIN — Medication 1000 MILLIGRAM(S): at 06:52

## 2025-08-12 RX ADMIN — OXYCODONE HYDROCHLORIDE 5 MILLIGRAM(S): 30 TABLET ORAL at 06:52

## 2025-08-12 RX ADMIN — Medication 1000 MILLIGRAM(S): at 06:45

## 2025-08-12 RX ADMIN — OXYCODONE HYDROCHLORIDE 5 MILLIGRAM(S): 30 TABLET ORAL at 06:45

## 2025-08-12 RX ADMIN — FLUOXETINE HYDROCHLORIDE 10 MILLIGRAM(S): 20 CAPSULE ORAL at 09:20

## 2025-08-15 LAB — SURGICAL PATHOLOGY STUDY: SIGNIFICANT CHANGE UP

## 2025-08-21 ENCOUNTER — APPOINTMENT (OUTPATIENT)
Dept: SURGERY | Facility: CLINIC | Age: 25
End: 2025-08-21
Payer: COMMERCIAL

## 2025-08-21 ENCOUNTER — LABORATORY RESULT (OUTPATIENT)
Age: 25
End: 2025-08-21

## 2025-08-21 DIAGNOSIS — C73 MALIGNANT NEOPLASM OF THYROID GLAND: ICD-10-CM

## 2025-08-21 PROBLEM — K21.9 GASTRO-ESOPHAGEAL REFLUX DISEASE WITHOUT ESOPHAGITIS: Chronic | Status: ACTIVE | Noted: 2025-07-29

## 2025-08-21 PROBLEM — K58.9 IRRITABLE BOWEL SYNDROME, UNSPECIFIED: Chronic | Status: ACTIVE | Noted: 2025-07-29

## 2025-08-21 PROCEDURE — 36415 COLL VENOUS BLD VENIPUNCTURE: CPT

## 2025-08-21 PROCEDURE — 99024 POSTOP FOLLOW-UP VISIT: CPT

## 2025-08-22 ENCOUNTER — NON-APPOINTMENT (OUTPATIENT)
Age: 25
End: 2025-08-22

## 2025-08-22 LAB
T3 SERPL-MCNC: 140 NG/DL
T4 FREE SERPL-MCNC: 1.2 NG/DL
THYROGLOB AB SERPL-ACNC: 17.5 IU/ML
THYROGLOB SERPL-MCNC: 45.8 NG/ML
TSH SERPL-ACNC: 2.89 UIU/ML

## 2025-09-09 ENCOUNTER — APPOINTMENT (OUTPATIENT)
Dept: PODIATRY | Facility: CLINIC | Age: 25
End: 2025-09-09
Payer: COMMERCIAL

## 2025-09-09 DIAGNOSIS — Z81.8 FAMILY HISTORY OF OTHER MENTAL AND BEHAVIORAL DISORDERS: ICD-10-CM

## 2025-09-09 DIAGNOSIS — Z82.49 FAMILY HISTORY OF ISCHEMIC HEART DISEASE AND OTHER DISEASES OF THE CIRCULATORY SYSTEM: ICD-10-CM

## 2025-09-09 DIAGNOSIS — F41.9 ANXIETY DISORDER, UNSPECIFIED: ICD-10-CM

## 2025-09-09 DIAGNOSIS — F32.A DEPRESSION, UNSPECIFIED: ICD-10-CM

## 2025-09-09 DIAGNOSIS — L60.0 INGROWING NAIL: ICD-10-CM

## 2025-09-09 DIAGNOSIS — Z83.79 FAMILY HISTORY OF OTHER DISEASES OF THE DIGESTIVE SYSTEM: ICD-10-CM

## 2025-09-09 DIAGNOSIS — Z84.1 FAMILY HISTORY OF DISORDERS OF KIDNEY AND URETER: ICD-10-CM

## 2025-09-09 DIAGNOSIS — Z85.850 PERSONAL HISTORY OF MALIGNANT NEOPLASM OF THYROID: ICD-10-CM

## 2025-09-09 DIAGNOSIS — Z83.3 FAMILY HISTORY OF DIABETES MELLITUS: ICD-10-CM

## 2025-09-09 DIAGNOSIS — M79.674 PAIN IN RIGHT TOE(S): ICD-10-CM

## 2025-09-09 PROCEDURE — 99203 OFFICE O/P NEW LOW 30 MIN: CPT | Mod: 25

## 2025-09-09 PROCEDURE — 11730 AVULSION NAIL PLATE SIMPLE 1: CPT | Mod: T5

## 2025-09-13 ENCOUNTER — EMERGENCY (EMERGENCY)
Facility: HOSPITAL | Age: 25
LOS: 1 days | End: 2025-09-13
Attending: EMERGENCY MEDICINE | Admitting: EMERGENCY MEDICINE
Payer: COMMERCIAL

## 2025-09-13 VITALS
SYSTOLIC BLOOD PRESSURE: 114 MMHG | OXYGEN SATURATION: 97 % | WEIGHT: 171.08 LBS | HEART RATE: 100 BPM | HEIGHT: 62 IN | TEMPERATURE: 98 F | DIASTOLIC BLOOD PRESSURE: 80 MMHG | RESPIRATION RATE: 18 BRPM

## 2025-09-13 VITALS
SYSTOLIC BLOOD PRESSURE: 123 MMHG | DIASTOLIC BLOOD PRESSURE: 76 MMHG | OXYGEN SATURATION: 98 % | HEART RATE: 87 BPM | RESPIRATION RATE: 18 BRPM

## 2025-09-13 DIAGNOSIS — K08.409 PARTIAL LOSS OF TEETH, UNSPECIFIED CAUSE, UNSPECIFIED CLASS: Chronic | ICD-10-CM

## 2025-09-13 PROCEDURE — 99284 EMERGENCY DEPT VISIT MOD MDM: CPT | Mod: 25

## 2025-09-13 PROCEDURE — 29515 APPLICATION SHORT LEG SPLINT: CPT | Mod: LT

## 2025-09-13 PROCEDURE — 73630 X-RAY EXAM OF FOOT: CPT

## 2025-09-13 PROCEDURE — 73630 X-RAY EXAM OF FOOT: CPT | Mod: 26,LT

## 2025-09-13 PROCEDURE — 73610 X-RAY EXAM OF ANKLE: CPT

## 2025-09-13 PROCEDURE — 73610 X-RAY EXAM OF ANKLE: CPT | Mod: 26,LT

## 2025-09-13 RX ORDER — ACETAMINOPHEN 500 MG/5ML
650 LIQUID (ML) ORAL ONCE
Refills: 0 | Status: COMPLETED | OUTPATIENT
Start: 2025-09-13 | End: 2025-09-13

## 2025-09-13 RX ADMIN — Medication 650 MILLIGRAM(S): at 11:30

## 2025-09-13 RX ADMIN — Medication 650 MILLIGRAM(S): at 10:53

## 2025-09-16 ENCOUNTER — APPOINTMENT (OUTPATIENT)
Dept: PODIATRY | Facility: CLINIC | Age: 25
End: 2025-09-16
Payer: COMMERCIAL

## 2025-09-16 DIAGNOSIS — L60.8 OTHER NAIL DISORDERS: ICD-10-CM

## 2025-09-16 DIAGNOSIS — M65.872 OTHER SYNOVITIS AND TENOSYNOVITIS, LEFT ANKLE AND FOOT: ICD-10-CM

## 2025-09-16 DIAGNOSIS — L03.031 CELLULITIS OF RIGHT TOE: ICD-10-CM

## 2025-09-16 DIAGNOSIS — S93.492A SPRAIN OF OTHER LIGAMENT OF LEFT ANKLE, INITIAL ENCOUNTER: ICD-10-CM

## 2025-09-16 PROCEDURE — 99213 OFFICE O/P EST LOW 20 MIN: CPT

## (undated) DEVICE — LABELS BLANK W PEN

## (undated) DEVICE — SUT CHROMIC 3-0 27" SH

## (undated) DEVICE — SOL IRR POUR H2O 500ML

## (undated) DEVICE — CANISTER DISPOSABLE THIN WALL 3000CC

## (undated) DEVICE — SUT SILK 2-0 18" FS

## (undated) DEVICE — SAFETY PIN

## (undated) DEVICE — ELCTR BOVIE PENCIL SMOKE EVACUATION

## (undated) DEVICE — ELCTR GROUNDING PAD ADULT COVIDIEN

## (undated) DEVICE — SUT PROLENE 0 30" CT-1

## (undated) DEVICE — SUT VICRYL PLUS 2-0 18" TIES UNDYED

## (undated) DEVICE — PREP BETADINE KIT

## (undated) DEVICE — LAP PAD W RING 18 X 18"

## (undated) DEVICE — DRAIN JACKSON PRATT 7MM FLAT FULL NO TROCAR

## (undated) DEVICE — DRSG BENZOIN 0.6CC

## (undated) DEVICE — SUT VICRYL 3-0 18" TIES UNDYED

## (undated) DEVICE — SUT VICRYL 2-0 27" SH UNDYED

## (undated) DEVICE — SUT MONOCRYL 4-0 27" PS-2 UNDYED

## (undated) DEVICE — SOL IRR POUR H2O 1500ML

## (undated) DEVICE — DRAPE 3/4 SHEET 52X76"

## (undated) DEVICE — SUT VICRYL 3-0 18" SH (POP-OFF)

## (undated) DEVICE — PACK HEAD & NECK

## (undated) DEVICE — VENODYNE/SCD SLEEVE CALF MEDIUM

## (undated) DEVICE — DRAIN RESERVOIR FOR JACKSON PRATT 100CC CARDINAL

## (undated) DEVICE — PROTECTOR HEEL / ELBOW FLUFFY

## (undated) DEVICE — DRAPE LAPAROTOMY TRANSVERSE

## (undated) DEVICE — Device